# Patient Record
Sex: FEMALE | Race: WHITE | NOT HISPANIC OR LATINO
[De-identification: names, ages, dates, MRNs, and addresses within clinical notes are randomized per-mention and may not be internally consistent; named-entity substitution may affect disease eponyms.]

---

## 2017-07-03 PROBLEM — Z00.00 ENCOUNTER FOR PREVENTIVE HEALTH EXAMINATION: Status: ACTIVE | Noted: 2017-07-03

## 2017-08-21 ENCOUNTER — APPOINTMENT (OUTPATIENT)
Dept: GASTROENTEROLOGY | Facility: CLINIC | Age: 47
End: 2017-08-21
Payer: COMMERCIAL

## 2017-08-21 VITALS
TEMPERATURE: 98.1 F | WEIGHT: 129 LBS | HEART RATE: 79 BPM | DIASTOLIC BLOOD PRESSURE: 80 MMHG | RESPIRATION RATE: 14 BRPM | OXYGEN SATURATION: 99 % | SYSTOLIC BLOOD PRESSURE: 138 MMHG | BODY MASS INDEX: 24.35 KG/M2 | HEIGHT: 61 IN

## 2017-08-21 DIAGNOSIS — Z82.49 FAMILY HISTORY OF ISCHEMIC HEART DISEASE AND OTHER DISEASES OF THE CIRCULATORY SYSTEM: ICD-10-CM

## 2017-08-21 DIAGNOSIS — Z86.018 PERSONAL HISTORY OF OTHER BENIGN NEOPLASM: ICD-10-CM

## 2017-08-21 DIAGNOSIS — D50.9 IRON DEFICIENCY ANEMIA, UNSPECIFIED: ICD-10-CM

## 2017-08-21 PROCEDURE — 99243 OFF/OP CNSLTJ NEW/EST LOW 30: CPT

## 2017-09-29 ENCOUNTER — APPOINTMENT (OUTPATIENT)
Dept: GASTROENTEROLOGY | Facility: CLINIC | Age: 47
End: 2017-09-29
Payer: COMMERCIAL

## 2017-09-29 PROCEDURE — 45385 COLONOSCOPY W/LESION REMOVAL: CPT | Mod: 33

## 2017-10-19 ENCOUNTER — TRANSCRIPTION ENCOUNTER (OUTPATIENT)
Age: 47
End: 2017-10-19

## 2023-06-08 ENCOUNTER — NON-APPOINTMENT (OUTPATIENT)
Age: 53
End: 2023-06-08

## 2023-06-15 ENCOUNTER — APPOINTMENT (OUTPATIENT)
Age: 53
End: 2023-06-15
Payer: COMMERCIAL

## 2023-06-15 PROCEDURE — 45378 DIAGNOSTIC COLONOSCOPY: CPT

## 2023-07-31 ENCOUNTER — APPOINTMENT (OUTPATIENT)
Age: 53
End: 2023-07-31
Payer: COMMERCIAL

## 2023-07-31 DIAGNOSIS — R10.13 EPIGASTRIC PAIN: ICD-10-CM

## 2023-07-31 PROCEDURE — 99214 OFFICE O/P EST MOD 30 MIN: CPT | Mod: 95

## 2023-08-01 PROBLEM — R10.13 DYSPEPSIA: Status: ACTIVE | Noted: 2023-08-01

## 2023-08-01 NOTE — REASON FOR VISIT
[Home] : at home, [unfilled] , at the time of the visit. [Medical Office: ([facility])___] : at the medical office located in  [Patient] : the patient

## 2023-08-01 NOTE — HISTORY OF PRESENT ILLNESS
[FreeTextEntry1] : Had COLO in June for history of polyps, this was normal and we reviewed She was asking if there is a role for routine EGD but she has no such indications She reports syspepsia to some combination of OTC supplements, which she has since stopped and is feeling  Also took some antibiotics for dental implants and is taking yogurt with no concerns re IBS Had a remote EGD with Dr Mccormick, hx of h pylori confirmed eradication by breath test Reassurance provided